# Patient Record
(demographics unavailable — no encounter records)

---

## 2024-11-12 NOTE — ASSESSMENT
[FreeTextEntry1] : 1. BPH/incontinence and urgency 2. LUTS/incontinece-symptoms stable on Vesicare 10 mg but with persistent constipation 3. Parkinson's disease - neurogenic bladder syndrome -progressing per pts daughters report 4. probable left fetal lobulated LP kidney    Plan: -trial of mirabegron 50mg- instructions and precautions discussed with pts daughter -advised that if med is not covered by insurance or pt experiences any adverse side effects he should stop and restart Vesicare 10mg- she understands -rto 12 months .  total time spent with encounter including face to face time with patient and review of chart and plan totaled 25 minutes

## 2024-11-12 NOTE — HISTORY OF PRESENT ILLNESS
[Urinary Incontinence] : urinary incontinence [Urinary Frequency] : urinary frequency [Nocturia] : nocturia [None] : None [FreeTextEntry1] : 83-year-old male with h/o BPH/LUTS/incontinence.   Patient has been maintained on Vesicare 10 MG per patient's daughter she reports his incontinence has remained stable with most of the wetting at night when he is sleeping he will void in the toilet during the day  she states he has intermittent constipation episodes which is controlled with a stool softener He denies dysuria, hematuria, fever, nausea, or other constitutional symptoms.   h/o deep brain stimulation to control his Parkinson symptoms - but pts daughter states his disease has become progressively worse  All past and present data reviewed: 03/2022 Uroflow= 58 cc capacity, PVR not interpretable   02/2022 Renal/Bladder US=lobulated left LP kidney   //     bladder= pre 163 cc, pvr= 26 cc  09/2022 bladder scan= 0 cc 05/2023 bladder scan= 15 cc 11/2023 bladder scan 11/9/23-  107ml- pt could not void and family is not sure when he voided last   [Straining] : no straining [Weak Stream] : no weak stream [Dysuria] : no dysuria [Hematuria - Gross] : no gross hematuria

## 2024-11-12 NOTE — PHYSICAL EXAM
[General Appearance - Well Developed] : well developed 25-Jun-2021 14:05 [General Appearance - Well Nourished] : well nourished [Normal Appearance] : normal appearance [Well Groomed] : well groomed [General Appearance - In No Acute Distress] : no acute distress [Abdomen Soft] : soft [Abdomen Tenderness] : non-tender [Costovertebral Angle Tenderness] : no ~M costovertebral angle tenderness [Urethral Meatus] : meatus normal [Scrotum] : the scrotum was normal [Testes Mass (___cm)] : there were no testicular masses [No Prostate Nodules] : no prostate nodules [Prostate Size ___ gm] : prostate size [unfilled] gm [Skin Color & Pigmentation] : normal skin color and pigmentation [Edema] : no peripheral edema [] : no respiratory distress [Respiration, Rhythm And Depth] : normal respiratory rhythm and effort [Exaggerated Use Of Accessory Muscles For Inspiration] : no accessory muscle use [Oriented To Time, Place, And Person] : oriented to person, place, and time [Affect] : the affect was normal [Mood] : the mood was normal [Not Anxious] : not anxious [No Focal Deficits] : no focal deficits [No Palpable Adenopathy] : no palpable adenopathy [FreeTextEntry1] : Parkinson's disease